# Patient Record
Sex: FEMALE | Race: WHITE | NOT HISPANIC OR LATINO | Employment: OTHER | URBAN - METROPOLITAN AREA
[De-identification: names, ages, dates, MRNs, and addresses within clinical notes are randomized per-mention and may not be internally consistent; named-entity substitution may affect disease eponyms.]

---

## 2017-11-07 ENCOUNTER — CATARACT CONSULT (OUTPATIENT)
Dept: URBAN - METROPOLITAN AREA CLINIC 39 | Facility: CLINIC | Age: 72
End: 2017-11-07

## 2017-11-07 VITALS
HEIGHT: 55 IN | DIASTOLIC BLOOD PRESSURE: 75 MMHG | RESPIRATION RATE: 14 BRPM | SYSTOLIC BLOOD PRESSURE: 118 MMHG | HEART RATE: 77 BPM

## 2017-11-07 DIAGNOSIS — H18.51: ICD-10-CM

## 2017-11-07 DIAGNOSIS — H25.812: ICD-10-CM

## 2017-11-07 DIAGNOSIS — H25.811: ICD-10-CM

## 2017-11-07 DIAGNOSIS — H43.393: ICD-10-CM

## 2017-11-07 PROCEDURE — G8427 DOCREV CUR MEDS BY ELIG CLIN: HCPCS

## 2017-11-07 PROCEDURE — 92286 ANT SGM IMG I&R SPECLR MIC: CPT

## 2017-11-07 PROCEDURE — 4040F PNEUMOC VAC/ADMIN/RCVD: CPT

## 2017-11-07 PROCEDURE — 92134 CPTRZ OPH DX IMG PST SGM RTA: CPT

## 2017-11-07 PROCEDURE — G8482 FLU IMMUNIZE ORDER/ADMIN: HCPCS

## 2017-11-07 PROCEDURE — 92136TC INTERFEROMETRY - TECHNICAL COMPONENT

## 2017-11-07 PROCEDURE — 99204 OFFICE O/P NEW MOD 45 MIN: CPT

## 2017-11-07 PROCEDURE — 92015 DETERMINE REFRACTIVE STATE: CPT

## 2017-11-07 PROCEDURE — 92025-2 CORNEAL TOPOGRAPHY, PT

## 2017-11-07 PROCEDURE — 1036F TOBACCO NON-USER: CPT

## 2017-11-07 ASSESSMENT — TONOMETRY
OD_IOP_MMHG: 16
OS_IOP_MMHG: 16

## 2017-11-07 ASSESSMENT — VISUAL ACUITY
OD_CC: J3
OD_SC: J10
OD_GLARE: 20/70
OD_SC: 20/60
OS_GLARE: 20/70
OS_SC: 20/30

## 2017-12-14 ENCOUNTER — SURGERY/PROCEDURE (OUTPATIENT)
Dept: URBAN - METROPOLITAN AREA CLINIC 39 | Facility: CLINIC | Age: 72
End: 2017-12-14

## 2017-12-14 DIAGNOSIS — H25.812: ICD-10-CM

## 2017-12-14 PROCEDURE — 66984CV REMOVE CATARACT, INSERT LENS, CUSTOM VISION

## 2017-12-14 PROCEDURE — 66999LNSR LENSAR LASER FOR CAT SX

## 2017-12-14 PROCEDURE — 65772LRI LRI DURING CAT SX

## 2017-12-15 ENCOUNTER — CATARACT POST-OP 1-DAY (OUTPATIENT)
Dept: URBAN - METROPOLITAN AREA CLINIC 39 | Facility: CLINIC | Age: 72
End: 2017-12-15

## 2017-12-15 DIAGNOSIS — Z96.1: ICD-10-CM

## 2017-12-15 PROCEDURE — 99024 POSTOP FOLLOW-UP VISIT: CPT

## 2017-12-15 ASSESSMENT — TONOMETRY
OS_IOP_MMHG: 19
OD_IOP_MMHG: 16

## 2017-12-15 ASSESSMENT — VISUAL ACUITY
OD_SC: 20/60
OS_SC: 20/20-2

## 2017-12-19 ENCOUNTER — POST OP/EVAL OF SECOND EYE (OUTPATIENT)
Dept: URBAN - METROPOLITAN AREA CLINIC 39 | Facility: CLINIC | Age: 72
End: 2017-12-19

## 2017-12-19 DIAGNOSIS — H25.811: ICD-10-CM

## 2017-12-19 DIAGNOSIS — Z96.1: ICD-10-CM

## 2017-12-19 PROCEDURE — 1036F TOBACCO NON-USER: CPT

## 2017-12-19 PROCEDURE — G8427 DOCREV CUR MEDS BY ELIG CLIN: HCPCS

## 2017-12-19 PROCEDURE — 92012 INTRM OPH EXAM EST PATIENT: CPT

## 2017-12-19 PROCEDURE — 99024 POSTOP FOLLOW-UP VISIT: CPT

## 2017-12-19 PROCEDURE — G8785 BP SCRN NO PERF AT INTERVAL: HCPCS

## 2017-12-19 ASSESSMENT — TONOMETRY
OD_IOP_MMHG: 14
OS_IOP_MMHG: 15

## 2017-12-19 ASSESSMENT — VISUAL ACUITY
OD_SC: 20/60-2
OD_BAT: 20/400
OS_SC: 20/25-2 SLOW

## 2017-12-21 ENCOUNTER — SURGERY/PROCEDURE (OUTPATIENT)
Dept: URBAN - METROPOLITAN AREA CLINIC 39 | Facility: CLINIC | Age: 72
End: 2017-12-21

## 2017-12-21 DIAGNOSIS — H25.811: ICD-10-CM

## 2017-12-21 PROCEDURE — 65772LRI LRI DURING CAT SX

## 2017-12-21 PROCEDURE — 66999LNSR LENSAR LASER FOR CAT SX

## 2017-12-21 PROCEDURE — 66984CV REMOVE CATARACT, INSERT LENS, CUSTOM VISION

## 2017-12-22 ENCOUNTER — CATARACT POST-OP 1-DAY (OUTPATIENT)
Dept: URBAN - METROPOLITAN AREA CLINIC 39 | Facility: CLINIC | Age: 72
End: 2017-12-22

## 2017-12-22 DIAGNOSIS — Z96.1: ICD-10-CM

## 2017-12-22 PROCEDURE — 99024 POSTOP FOLLOW-UP VISIT: CPT

## 2017-12-22 ASSESSMENT — VISUAL ACUITY
OD_SC: J1
OS_SC: 20/20-2
OS_SC: J10
OD_SC: 20/60

## 2017-12-22 ASSESSMENT — TONOMETRY
OS_IOP_MMHG: 20
OD_IOP_MMHG: 20

## 2018-01-19 ENCOUNTER — POST-OP (OUTPATIENT)
Dept: URBAN - METROPOLITAN AREA CLINIC 39 | Facility: CLINIC | Age: 73
End: 2018-01-19

## 2018-01-19 DIAGNOSIS — Z96.1: ICD-10-CM

## 2018-01-19 PROCEDURE — 99024 POSTOP FOLLOW-UP VISIT: CPT

## 2018-01-19 ASSESSMENT — KERATOMETRY
OS_AXISANGLE_DEGREES: 44.25
OD_AXISANGLE_DEGREES: 45.25
OD_K1POWER_DIOPTERS: 44.00
OS_K1POWER_DIOPTERS: 43.75

## 2018-01-19 ASSESSMENT — TONOMETRY
OS_IOP_MMHG: 16
OD_IOP_MMHG: 16

## 2018-01-19 ASSESSMENT — VISUAL ACUITY
OD_SC: 20/30-2
OS_SC: 20/25+2

## 2018-02-28 ASSESSMENT — KERATOMETRY
OD_K1POWER_DIOPTERS: 44.00
OD_AXISANGLE_DEGREES: 45.25
OS_AXISANGLE_DEGREES: 44.25
OS_K1POWER_DIOPTERS: 43.75

## 2018-03-01 ENCOUNTER — SURGERY/PROCEDURE (OUTPATIENT)
Dept: URBAN - METROPOLITAN AREA SURGERY 14 | Facility: SURGERY | Age: 73
End: 2018-03-01

## 2018-03-01 ENCOUNTER — CONSULT (OUTPATIENT)
Dept: URBAN - METROPOLITAN AREA CLINIC 39 | Facility: CLINIC | Age: 73
End: 2018-03-01

## 2018-03-01 DIAGNOSIS — H26.492: ICD-10-CM

## 2018-03-01 PROCEDURE — 1036F TOBACCO NON-USER: CPT

## 2018-03-01 PROCEDURE — 66821 AFTER CATARACT LASER SURGERY: CPT

## 2018-03-01 PROCEDURE — 92014 COMPRE OPH EXAM EST PT 1/>: CPT

## 2018-03-01 PROCEDURE — G8428 CUR MEDS NOT DOCUMENT: HCPCS

## 2018-03-01 ASSESSMENT — VISUAL ACUITY
OD_SC: 20/30-1
OS_SC: 20/25-1
OS_GLARE: 20/80

## 2018-03-01 ASSESSMENT — TONOMETRY
OS_IOP_MMHG: 12
OD_IOP_MMHG: 12

## 2018-03-02 ASSESSMENT — KERATOMETRY
OD_K1POWER_DIOPTERS: 44.00
OD_AXISANGLE_DEGREES: 45.25
OS_K1POWER_DIOPTERS: 43.75
OS_AXISANGLE_DEGREES: 44.25

## 2018-03-08 ENCOUNTER — POST-OP (OUTPATIENT)
Dept: URBAN - METROPOLITAN AREA CLINIC 39 | Facility: CLINIC | Age: 73
End: 2018-03-08

## 2018-03-08 DIAGNOSIS — Z96.1: ICD-10-CM

## 2018-03-08 PROCEDURE — 99024 POSTOP FOLLOW-UP VISIT: CPT

## 2018-03-08 ASSESSMENT — KERATOMETRY
OS_K1POWER_DIOPTERS: 44.00
OS_AXISANGLE_DEGREES: 44.25

## 2018-03-08 ASSESSMENT — VISUAL ACUITY
OS_SC: 20/25
OD_SC: 20/30-1

## 2018-03-08 ASSESSMENT — TONOMETRY: OS_IOP_MMHG: 14

## 2018-08-08 ENCOUNTER — ESTABLISHED COMPREHENSIVE EXAM (OUTPATIENT)
Dept: URBAN - METROPOLITAN AREA CLINIC 39 | Facility: CLINIC | Age: 73
End: 2018-08-08

## 2018-08-08 DIAGNOSIS — H04.123: ICD-10-CM

## 2018-08-08 DIAGNOSIS — H43.393: ICD-10-CM

## 2018-08-08 DIAGNOSIS — H26.491: ICD-10-CM

## 2018-08-08 PROCEDURE — 1036F TOBACCO NON-USER: CPT

## 2018-08-08 PROCEDURE — 92014 COMPRE OPH EXAM EST PT 1/>: CPT

## 2018-08-08 PROCEDURE — 92015 DETERMINE REFRACTIVE STATE: CPT

## 2018-08-08 PROCEDURE — G8427 DOCREV CUR MEDS BY ELIG CLIN: HCPCS

## 2018-08-08 PROCEDURE — G8785 BP SCRN NO PERF AT INTERVAL: HCPCS

## 2018-08-08 PROCEDURE — G9903 PT SCRN TBCO ID AS NON USER: HCPCS

## 2018-08-08 ASSESSMENT — TONOMETRY
OS_IOP_MMHG: 15
OD_IOP_MMHG: 15

## 2018-08-08 ASSESSMENT — VISUAL ACUITY
OS_SC: 20/25
OU_SC: 20/20-1
OD_SC: J1
OD_BAT: 20/40
OD_SC: 20/70-2
OU_SC: J1
OS_SC: J10

## 2018-08-08 ASSESSMENT — KERATOMETRY
OS_AXISANGLE_DEGREES: 44.25
OS_K1POWER_DIOPTERS: 44.00

## 2019-05-14 NOTE — PATIENT DISCUSSION
CORNEAL ECTASIA OD:  DID TOPOGRAPHY TODAY. EXPLAINED IRREGULAR ASTIGMATISM AND CORNEAL ECTASIA TO PATIENT. PATIENT STATES THE RIGHT EYE HAD NEVER SEEN 20/20 AFTER LASIK. PATIENT STATED SHE HAD USED A HARD CONTACT LENS BEFORE LASIK. SUGGESTED PATIENT GOES BACK TO HARD LENS TO ELIMINATE IRREGULAR ASTIGMATISM . PATIENT WANTS TO THINK ABOUT IT AT THIS TIME.